# Patient Record
Sex: FEMALE | Race: OTHER | HISPANIC OR LATINO
[De-identification: names, ages, dates, MRNs, and addresses within clinical notes are randomized per-mention and may not be internally consistent; named-entity substitution may affect disease eponyms.]

---

## 2021-01-19 ENCOUNTER — APPOINTMENT (OUTPATIENT)
Dept: ANTEPARTUM | Facility: HOSPITAL | Age: 21
End: 2021-01-19

## 2021-01-19 ENCOUNTER — EMERGENCY (EMERGENCY)
Facility: HOSPITAL | Age: 21
LOS: 0 days | Discharge: ANOTHER HOSPITAL | End: 2021-01-19
Attending: EMERGENCY MEDICINE
Payer: MEDICAID

## 2021-01-19 ENCOUNTER — OUTPATIENT (OUTPATIENT)
Dept: INPATIENT UNIT | Facility: HOSPITAL | Age: 21
LOS: 1 days | End: 2021-01-19

## 2021-01-19 ENCOUNTER — APPOINTMENT (OUTPATIENT)
Dept: ANTEPARTUM | Facility: CLINIC | Age: 21
End: 2021-01-19

## 2021-01-19 ENCOUNTER — ASOB RESULT (OUTPATIENT)
Age: 21
End: 2021-01-19

## 2021-01-19 VITALS
TEMPERATURE: 99 F | SYSTOLIC BLOOD PRESSURE: 112 MMHG | RESPIRATION RATE: 16 BRPM | DIASTOLIC BLOOD PRESSURE: 65 MMHG | HEART RATE: 79 BPM

## 2021-01-19 VITALS
RESPIRATION RATE: 19 BRPM | DIASTOLIC BLOOD PRESSURE: 79 MMHG | HEIGHT: 65 IN | OXYGEN SATURATION: 100 % | HEART RATE: 87 BPM | TEMPERATURE: 99 F | SYSTOLIC BLOOD PRESSURE: 129 MMHG | WEIGHT: 130.07 LBS

## 2021-01-19 VITALS — SYSTOLIC BLOOD PRESSURE: 97 MMHG | HEART RATE: 83 BPM | DIASTOLIC BLOOD PRESSURE: 55 MMHG

## 2021-01-19 DIAGNOSIS — Z3A.20 20 WEEKS GESTATION OF PREGNANCY: ICD-10-CM

## 2021-01-19 DIAGNOSIS — O26.892 OTHER SPECIFIED PREGNANCY RELATED CONDITIONS, SECOND TRIMESTER: ICD-10-CM

## 2021-01-19 DIAGNOSIS — R10.9 UNSPECIFIED ABDOMINAL PAIN: ICD-10-CM

## 2021-01-19 DIAGNOSIS — Z98.890 OTHER SPECIFIED POSTPROCEDURAL STATES: Chronic | ICD-10-CM

## 2021-01-19 LAB
ALBUMIN SERPL ELPH-MCNC: 2.6 G/DL — LOW (ref 3.3–5)
ALBUMIN SERPL ELPH-MCNC: 3.1 G/DL — LOW (ref 3.3–5)
ALP SERPL-CCNC: 71 U/L — SIGNIFICANT CHANGE UP (ref 40–120)
ALP SERPL-CCNC: 78 U/L — SIGNIFICANT CHANGE UP (ref 40–120)
ALT FLD-CCNC: 16 U/L — SIGNIFICANT CHANGE UP (ref 12–78)
ALT FLD-CCNC: 8 U/L — SIGNIFICANT CHANGE UP (ref 4–33)
ANION GAP SERPL CALC-SCNC: 7 MMOL/L — SIGNIFICANT CHANGE UP (ref 5–17)
ANION GAP SERPL CALC-SCNC: 7 MMOL/L — SIGNIFICANT CHANGE UP (ref 7–14)
APPEARANCE UR: ABNORMAL
APPEARANCE UR: CLEAR — SIGNIFICANT CHANGE UP
AST SERPL-CCNC: 15 U/L — SIGNIFICANT CHANGE UP (ref 4–32)
AST SERPL-CCNC: 9 U/L — LOW (ref 15–37)
BACTERIA # UR AUTO: ABNORMAL
BACTERIA # UR AUTO: ABNORMAL
BASOPHILS # BLD AUTO: 0 K/UL — SIGNIFICANT CHANGE UP (ref 0–0.2)
BASOPHILS # BLD AUTO: 0.03 K/UL — SIGNIFICANT CHANGE UP (ref 0–0.2)
BASOPHILS NFR BLD AUTO: 0 % — SIGNIFICANT CHANGE UP (ref 0–2)
BASOPHILS NFR BLD AUTO: 0.2 % — SIGNIFICANT CHANGE UP (ref 0–2)
BILIRUB SERPL-MCNC: 0.2 MG/DL — SIGNIFICANT CHANGE UP (ref 0.2–1.2)
BILIRUB SERPL-MCNC: <0.2 MG/DL — SIGNIFICANT CHANGE UP (ref 0.2–1.2)
BILIRUB UR-MCNC: NEGATIVE — SIGNIFICANT CHANGE UP
BILIRUB UR-MCNC: NEGATIVE — SIGNIFICANT CHANGE UP
BLD GP AB SCN SERPL QL: SIGNIFICANT CHANGE UP
BUN SERPL-MCNC: 3 MG/DL — LOW (ref 7–23)
BUN SERPL-MCNC: 4 MG/DL — LOW (ref 7–23)
CALCIUM SERPL-MCNC: 8.1 MG/DL — LOW (ref 8.5–10.1)
CALCIUM SERPL-MCNC: 8.5 MG/DL — SIGNIFICANT CHANGE UP (ref 8.4–10.5)
CHLORIDE SERPL-SCNC: 105 MMOL/L — SIGNIFICANT CHANGE UP (ref 98–107)
CHLORIDE SERPL-SCNC: 108 MMOL/L — SIGNIFICANT CHANGE UP (ref 96–108)
CO2 SERPL-SCNC: 25 MMOL/L — SIGNIFICANT CHANGE UP (ref 22–31)
CO2 SERPL-SCNC: 25 MMOL/L — SIGNIFICANT CHANGE UP (ref 22–31)
COLOR SPEC: SIGNIFICANT CHANGE UP
COLOR SPEC: YELLOW — SIGNIFICANT CHANGE UP
CREAT SERPL-MCNC: 0.43 MG/DL — LOW (ref 0.5–1.3)
CREAT SERPL-MCNC: 0.51 MG/DL — SIGNIFICANT CHANGE UP (ref 0.5–1.3)
DIFF PNL FLD: NEGATIVE — SIGNIFICANT CHANGE UP
DIFF PNL FLD: NEGATIVE — SIGNIFICANT CHANGE UP
EOSINOPHIL # BLD AUTO: 0.14 K/UL — SIGNIFICANT CHANGE UP (ref 0–0.5)
EOSINOPHIL # BLD AUTO: 0.18 K/UL — SIGNIFICANT CHANGE UP (ref 0–0.5)
EOSINOPHIL NFR BLD AUTO: 1 % — SIGNIFICANT CHANGE UP (ref 0–6)
EOSINOPHIL NFR BLD AUTO: 1.4 % — SIGNIFICANT CHANGE UP (ref 0–6)
EPI CELLS # UR: >27 /HPF — HIGH (ref 0–5)
EPI CELLS # UR: ABNORMAL
GLUCOSE SERPL-MCNC: 72 MG/DL — SIGNIFICANT CHANGE UP (ref 70–99)
GLUCOSE SERPL-MCNC: 77 MG/DL — SIGNIFICANT CHANGE UP (ref 70–99)
GLUCOSE UR QL: NEGATIVE MG/DL — SIGNIFICANT CHANGE UP
GLUCOSE UR QL: NEGATIVE — SIGNIFICANT CHANGE UP
HCG SERPL-ACNC: HIGH MIU/ML
HCG UR QL: POSITIVE
HCT VFR BLD CALC: 26.2 % — LOW (ref 34.5–45)
HCT VFR BLD CALC: 29.2 % — LOW (ref 34.5–45)
HGB BLD-MCNC: 9 G/DL — LOW (ref 11.5–15.5)
HGB BLD-MCNC: 9.9 G/DL — LOW (ref 11.5–15.5)
HYALINE CASTS # UR AUTO: 1 /LPF — SIGNIFICANT CHANGE UP (ref 0–7)
IANC: 9.75 K/UL — HIGH (ref 1.5–8.5)
IMM GRANULOCYTES NFR BLD AUTO: 1.6 % — HIGH (ref 0–1.5)
INR BLD: 1.02 RATIO — SIGNIFICANT CHANGE UP (ref 0.88–1.16)
KETONES UR-MCNC: NEGATIVE — SIGNIFICANT CHANGE UP
KETONES UR-MCNC: NEGATIVE — SIGNIFICANT CHANGE UP
LEUKOCYTE ESTERASE UR-ACNC: ABNORMAL
LEUKOCYTE ESTERASE UR-ACNC: ABNORMAL
LYMPHOCYTES # BLD AUTO: 1.77 K/UL — SIGNIFICANT CHANGE UP (ref 1–3.3)
LYMPHOCYTES # BLD AUTO: 14 % — SIGNIFICANT CHANGE UP (ref 13–44)
LYMPHOCYTES # BLD AUTO: 32 % — SIGNIFICANT CHANGE UP (ref 13–44)
LYMPHOCYTES # BLD AUTO: 4.36 K/UL — HIGH (ref 1–3.3)
MANUAL SMEAR VERIFICATION: SIGNIFICANT CHANGE UP
MCHC RBC-ENTMCNC: 26.3 PG — LOW (ref 27–34)
MCHC RBC-ENTMCNC: 26.4 PG — LOW (ref 27–34)
MCHC RBC-ENTMCNC: 33.9 GM/DL — SIGNIFICANT CHANGE UP (ref 32–36)
MCHC RBC-ENTMCNC: 34.4 GM/DL — SIGNIFICANT CHANGE UP (ref 32–36)
MCV RBC AUTO: 76.8 FL — LOW (ref 80–100)
MCV RBC AUTO: 77.5 FL — LOW (ref 80–100)
MONOCYTES # BLD AUTO: 0.54 K/UL — SIGNIFICANT CHANGE UP (ref 0–0.9)
MONOCYTES # BLD AUTO: 0.73 K/UL — SIGNIFICANT CHANGE UP (ref 0–0.9)
MONOCYTES NFR BLD AUTO: 4 % — SIGNIFICANT CHANGE UP (ref 2–14)
MONOCYTES NFR BLD AUTO: 5.8 % — SIGNIFICANT CHANGE UP (ref 2–14)
NEUTROPHILS # BLD AUTO: 8.57 K/UL — HIGH (ref 1.8–7.4)
NEUTROPHILS # BLD AUTO: 9.75 K/UL — HIGH (ref 1.8–7.4)
NEUTROPHILS NFR BLD AUTO: 62 % — SIGNIFICANT CHANGE UP (ref 43–77)
NEUTROPHILS NFR BLD AUTO: 77 % — SIGNIFICANT CHANGE UP (ref 43–77)
NEUTS BAND # BLD: 1 % — SIGNIFICANT CHANGE UP (ref 0–8)
NITRITE UR-MCNC: NEGATIVE — SIGNIFICANT CHANGE UP
NITRITE UR-MCNC: NEGATIVE — SIGNIFICANT CHANGE UP
NRBC # BLD: 0 /100 WBCS — SIGNIFICANT CHANGE UP
NRBC # BLD: 0 /100 — SIGNIFICANT CHANGE UP (ref 0–0)
NRBC # BLD: SIGNIFICANT CHANGE UP /100 WBCS (ref 0–0)
NRBC # FLD: 0 K/UL — SIGNIFICANT CHANGE UP
PH UR: 5 — SIGNIFICANT CHANGE UP (ref 5–8)
PH UR: 6.5 — SIGNIFICANT CHANGE UP (ref 5–8)
PLAT MORPH BLD: NORMAL — SIGNIFICANT CHANGE UP
PLATELET # BLD AUTO: 264 K/UL — SIGNIFICANT CHANGE UP (ref 150–400)
PLATELET # BLD AUTO: 283 K/UL — SIGNIFICANT CHANGE UP (ref 150–400)
POTASSIUM SERPL-MCNC: 3.1 MMOL/L — LOW (ref 3.5–5.3)
POTASSIUM SERPL-MCNC: 3.4 MMOL/L — LOW (ref 3.5–5.3)
POTASSIUM SERPL-SCNC: 3.1 MMOL/L — LOW (ref 3.5–5.3)
POTASSIUM SERPL-SCNC: 3.4 MMOL/L — LOW (ref 3.5–5.3)
PROT SERPL-MCNC: 6.1 G/DL — SIGNIFICANT CHANGE UP (ref 6–8.3)
PROT SERPL-MCNC: 6.6 GM/DL — SIGNIFICANT CHANGE UP (ref 6–8.3)
PROT UR-MCNC: ABNORMAL
PROT UR-MCNC: NEGATIVE MG/DL — SIGNIFICANT CHANGE UP
PROTHROM AB SERPL-ACNC: 11.8 SEC — SIGNIFICANT CHANGE UP (ref 10.6–13.6)
RBC # BLD: 3.41 M/UL — LOW (ref 3.8–5.2)
RBC # BLD: 3.77 M/UL — LOW (ref 3.8–5.2)
RBC # FLD: 13.7 % — SIGNIFICANT CHANGE UP (ref 10.3–14.5)
RBC # FLD: 13.9 % — SIGNIFICANT CHANGE UP (ref 10.3–14.5)
RBC BLD AUTO: SIGNIFICANT CHANGE UP
RBC CASTS # UR COMP ASSIST: 3 /HPF — SIGNIFICANT CHANGE UP (ref 0–4)
SARS-COV-2 RNA SPEC QL NAA+PROBE: SIGNIFICANT CHANGE UP
SODIUM SERPL-SCNC: 137 MMOL/L — SIGNIFICANT CHANGE UP (ref 135–145)
SODIUM SERPL-SCNC: 140 MMOL/L — SIGNIFICANT CHANGE UP (ref 135–145)
SP GR SPEC: 1.01 — SIGNIFICANT CHANGE UP (ref 1.01–1.02)
SP GR SPEC: 1.02 — SIGNIFICANT CHANGE UP (ref 1.01–1.02)
UROBILINOGEN FLD QL: NEGATIVE MG/DL — SIGNIFICANT CHANGE UP
UROBILINOGEN FLD QL: SIGNIFICANT CHANGE UP
WBC # BLD: 12.66 K/UL — HIGH (ref 3.8–10.5)
WBC # BLD: 13.61 K/UL — HIGH (ref 3.8–10.5)
WBC # FLD AUTO: 12.66 K/UL — HIGH (ref 3.8–10.5)
WBC # FLD AUTO: 13.61 K/UL — HIGH (ref 3.8–10.5)
WBC UR QL: 14 /HPF — HIGH (ref 0–5)
WBC UR QL: ABNORMAL

## 2021-01-19 PROCEDURE — 99285 EMERGENCY DEPT VISIT HI MDM: CPT

## 2021-01-19 NOTE — OB PROVIDER TRIAGE NOTE - NSOBPROVIDERNOTE_OBGYN_ALL_OB_FT
h+p obtained via  # [id number]    20 y.o.  MARK unknown LMP unknown transfer from [facility] via stretcher, via ambulance for evaluation of abdominal pain last night now resolved. Pt denies LOF, VB, fever, chills, nausea, vomiting, sexual intercourse in past 24 hours. Pt states normal BM last night and positive fetal movements. Denies past covid 19 infection and past covid 19 direct contact. Pt states she traveled for 2 [place] and [place] 1 [place] ago.     allergies:  NKDA  medications:  prenatal vitamins    med/surg hx:   denies    OBGYN hx:  sab@ 6 weeks with d+c    abdomen soft, nontender, diffuse pustules noted (patients states x 1 month 2/2 "allergic reaction to dog")  taus: sliup, cephalic presentation, posterior placenta, bpp 8/8, gagan 14.9, efw 1548 grams (~31.2 weeks)  sse: moderate frothy discharge noted, cervix appears closed  tvus: cervical length 5.2, no dynamic changes/funneling noted  nst wnl  toco: no ctx noted    ua, cbc, cmp, gc/chlamydia, genital culture pending    ATU sono pending    Phil, NP h+p obtained via  # [id number]    20 y.o.  MARK unknown LMP unknown transfer from [facility] via stretcher, via ambulance for evaluation of abdominal pain last night now resolved. Pt denies LOF, VB, fever, chills, nausea, vomiting, sexual intercourse in past 24 hours. Pt states normal BM last night and positive fetal movements. Denies past covid 19 infection and past covid 19 direct contact. Pt states she traveled for 2 [place] and [place] 1 [place].     allergies:  NKDA  medications:  prenatal vitamins    med/surg hx:   denies    OBGYN hx:  sab@ 6 weeks with d+c    abdomen soft, nontender, diffuse pustules noted (patients states x 1 month 2/2 "allergic reaction to dog")  taus: sliup, cephalic presentation, posterior placenta, bpp 8/8, gagan 14.9, efw 1548 grams (~31.2 weeks)  sse: moderate frothy discharge noted, cervix appears closed  tvus: cervical length 5.2, no dynamic changes/funneling noted  nst wnl  toco: no ctx noted    ua, cbc, cmp, gc/chlamydia, genital culture, covid 19 swab pending    ATU sono pending    Phil, NP h+p obtained via  # [id number]    20 y.o.  MARK unknown LMP unknown transfer from [facility] via stretcher, via ambulance for evaluation of abdominal pain last night now resolved. Pt denies LOF, VB, fever, chills, nausea, vomiting, sexual intercourse in past 24 hours. Pt states normal BM last night and positive fetal movements. Denies past covid 19 infection and past covid 19 direct contact. Pt states she traveled for 2 [place] and [place] 1 [place].     allergies:  NKDA  medications:  prenatal vitamins    med/surg hx:   denies    OBGYN hx:  sab@ 6 weeks with d+c    abdomen soft, nontender, diffuse pustules noted (patients states x 1 month 2/2 "allergic reaction to dog")  taus: sliup, cephalic presentation, posterior placenta, bpp 8/8, gagan 14.9, efw 1548 grams (~31.2 weeks)  sse: moderate frothy discharge noted, cervix appears closed  tvus: cervical length 5.2, no dynamic changes/funneling noted  nst wnl  toco: no ctx noted    ua, cbc, cmp, gc/chlamydia, genital culture, covid 19 swab pending    ATU sono pending    12p report to ELEUTERIO Clay NP.    1400  Resumed care of patient. Awaiting ATU sono    1408  labs reviewed:    12.6 > 26.2 < 264    K+ 3.4    UA contaminated    ATU sono:    MARK ~ 2021 ~GA 29.1 weeks    EFW 1316 g 4lbs 14 oz  gagan 19, cephalic presentation, posterior placenta    1555  Dr Mejia and Dr Vargas called. Awaiting discussion re: plan of care    ISELA Villarreal h+p obtained via  # [id number]    20 y.o.  MARK unknown LMP unknown transfer from [facility] via stretcher, via ambulance for evaluation of abdominal pain last night now resolved. Pt denies LOF, VB, fever, chills, nausea, vomiting, sexual intercourse in past 24 hours. Pt states normal BM last night and positive fetal movements. Denies past covid 19 infection and past covid 19 direct contact. Pt states she traveled for 2 [place] and [place].     allergies:  NKDA  medications:  prenatal vitamins    med/surg hx:   denies    OBGYN hx:  sab@ 6 weeks with d+c    abdomen soft, nontender, diffuse pustules noted (patients states x 1 month 2/2 "allergic reaction to dog")  taus: sliup, cephalic presentation, posterior placenta, bpp 8/8, gagan 14.9, efw 1548 grams (~31.2 weeks)  sse: moderate frothy discharge noted, cervix appears closed  tvus: cervical length 5.2, no dynamic changes/funneling noted  nst wnl  toco: no ctx noted    ua, cbc, cmp, gc/chlamydia, genital culture, covid 19 swab pending    ATU sono pending    12p report to ELEUTERIO Clay NP.    1400  Resumed care of patient. Awaiting ATU sono    1408  labs reviewed:    12.6 > 9/26.2 < 264    K+ 3.4    UA contaminated    ATU sono:    MARK ~ 2021 ~GA 29.1 weeks    EFW 1316 g 4lbs 14 oz  gagan 19, cephalic presentation, posterior placenta    1555  Dr Mejia and Dr Vargas called. Awaiting discussion re: plan of care    1605  Discussed with Dr Mejia. Dr Mejia reviewing lab results. Pt given Prenatal Care Programs Accepting Medicaid Reimbursement form. PCAP clinic emailed to establish prenatal care appointment.  labor precautions/fetal kick counts reviewed. Encouraged increased PO hydration.     ISELA Villarreal h+p obtained via  # [id number]    20 y.o.  MARK unknown LMP unknown transfer from [facility] via stretcher, via ambulance for evaluation of abdominal pain last night now resolved. Pt denies LOF, VB, fever, chills, nausea, vomiting, sexual intercourse in past 24 hours. Pt states normal BM last night and positive fetal movements. Denies past covid 19 infection and past covid 19 direct contact. Pt states she traveled for 2 [place] and [place] 1 [place].     allergies:  NKDA  medications:  prenatal vitamins    med/surg hx:   denies    OBGYN hx:  sab@ 6 weeks with d+c    abdomen soft, nontender, diffuse pustules noted (patients states x 1 month 2/2 "allergic reaction to dog")  taus: sliup, cephalic presentation, posterior placenta, bpp 8/8, gagan 14.9, efw 1548 grams (~31.2 weeks)  sse: moderate frothy discharge noted, cervix appears closed  tvus: cervical length 5.2, no dynamic changes/funneling noted  nst wnl  toco: no ctx noted    ua, cbc, cmp, gc/chlamydia, genital culture, covid 19 swab pending    ATU sono pending    12p report to ELEUTERIO Clay NP.    1400  Resumed care of patient. Awaiting ATU sono    1408  labs reviewed:    12.6 > 9/26.2 < 264    K+ 3.4    UA contaminated    ATU sono:    MARK ~ 2021 ~GA 29.1 weeks    EFW 1316 g 4lbs 14 oz  gagan 19, cephalic presentation, posterior placenta    1555  Dr Mejia and Dr Vargas called. Awaiting discussion re: plan of care    1605  Discussed with Dr Mejia. Dr Mejia reviewing lab results. Pt given Prenatal Care Programs Accepting Medicaid Reimbursement form. PCAP clinic emailed to establish prenatal care appointment.  labor precautions/fetal kick counts reviewed via  # [id number]. Encouraged increased PO hydration.     ISELA Villarreal

## 2021-01-19 NOTE — OB PROVIDER TRIAGE NOTE - HISTORY OF PRESENT ILLNESS
h+p obtained via  # [id number]  20 y.o.  MARK unknown LMP unknown transfer from [facility] via stretcher, via ambulance for evaluation of abdominal pain last night now resolved. Pt denies LOF, VB, fever, chills, nausea, vomiting, sexual intercourse in past 24 hours. Pt states normal BM last night and positive fetal movements. Denies past covid 19 infection and past covid 19 direct contact. Pt states she traveled for 2 [place] and [place] 1 [place].

## 2021-01-19 NOTE — ED PROVIDER NOTE - CLINICAL SUMMARY MEDICAL DECISION MAKING FREE TEXT BOX
20 year old woman  over 20 weeks gestation c/o abdominal pain.  IUP noted on bedside US with .  No abdominal tenderness on exam patient in no acute distress; vitals stable.  Patient transferred to [facility] for obstetric care and evaluation.

## 2021-01-19 NOTE — OB PROVIDER TRIAGE NOTE - NSHPPHYSICALEXAM_GEN_ALL_CORE
abdomen soft, nontender, diffuse pustules noted (patients states x 1 month 2/2 "allergic reaction to dog")  taus: sliup, cephalic presentation, posterior placenta, bpp 8/8, gagan 14.9, efw 1548 grams (~31.2 weeks)  sse: moderate frothy discharge noted, cervix appears closed  tvus: cervical length 5.2, no dynamic changes/funneling noted  nst wnl  toco: no ctx noted

## 2021-01-19 NOTE — ED ADULT NURSE NOTE - NSIMPLEMENTINTERV_GEN_ALL_ED
Implemented All Universal Safety Interventions:  [place] to call system. Call bell, personal items and telephone within reach. Instruct patient to call for assistance. Room bathroom lighting operational. Non-slip footwear when patient is off stretcher. Physically safe environment: no spills, clutter or unnecessary equipment. Stretcher in lowest position, wheels locked, appropriate side rails in place.

## 2021-01-19 NOTE — OB RN TRIAGE NOTE - CHIEF COMPLAINT QUOTE
abd pain x1 week, mostly at night, small amt pain currently  travelled here from [facility] 1 month, no [facility],   [id number]  #

## 2021-01-19 NOTE — ED ADULT TRIAGE NOTE - CHIEF COMPLAINT QUOTE
patient c/o of lower abdominal pain with back pain started 1 week ago , patient is 7 months pregnant denied vaginal bleed or discharge c/o of N/V , denied fever no cough denied chest pain no difficulty breathing , at the time of triage

## 2021-01-19 NOTE — ED ADULT NURSE NOTE - OBJECTIVE STATEMENT
Pt is a Turkish speaking 21 yo F, AOx4 c/o of lower abdominal pain with back pain started 1 week ago , patient is 7 months pregnant denied vaginal bleed or discharge c/o of N/V , denied fever no cough denied chest pain no difficulty breathing. Pt denies any pmh, sx hx, allergy.

## 2021-01-19 NOTE — ED PROVIDER NOTE - PROGRESS NOTE DETAILS
IUP noted on bedside ultrasound with  Transfer center unable to reach OB will try again and call back in 10 minutes.

## 2021-01-19 NOTE — ED PROVIDER NOTE - OBJECTIVE STATEMENT
This patient is a 20 year old woman This patient is a 20 year old woman who presents to the ER reporting that she is pregnant and experiencing abdominal pain x1 week.  Patient states that she was last "checked" in this pregnancy 4 months ago when she was living in [facility] and has had no prenatal care.  She states that she was 3 months pregnant at that time.  Patient unable to give dates for her last menses.  Patient has been in the [place] for 1 [place].  Patient states that the pain is intermittent 10/10 in severity and radiates to her back bilaterally.  She denies fever, dysuria, hematuria, and vomiting. This patient is a 20 year old woman who presents to the ER reporting that she is pregnant and experiencing abdominal pain x1 week.  Patient states that she was last "checked" in this pregnancy 4 months ago when she was living in [facility] and has had no prenatal care.  She states that she was 3 months pregnant at that time.  Patient unable to give dates for her last menses.  Patient has been in the [place] for 1 [place].  Patient states that the pain is intermittent 10/10 in severity and radiates to her back bilaterally.  She denies fever, dysuria, hematuria, and vomiting.     Abundio: [id number]

## 2021-01-20 LAB
C TRACH RRNA SPEC QL NAA+PROBE: SIGNIFICANT CHANGE UP
N GONORRHOEA RRNA SPEC QL NAA+PROBE: SIGNIFICANT CHANGE UP

## 2021-01-21 PROBLEM — Z00.00 ENCOUNTER FOR PREVENTIVE HEALTH EXAMINATION: Status: ACTIVE | Noted: 2021-01-21

## 2021-02-03 ENCOUNTER — NON-APPOINTMENT (OUTPATIENT)
Age: 21
End: 2021-02-03

## 2021-02-03 ENCOUNTER — RESULT REVIEW (OUTPATIENT)
Age: 21
End: 2021-02-03

## 2021-02-03 ENCOUNTER — APPOINTMENT (OUTPATIENT)
Dept: OBGYN | Facility: HOSPITAL | Age: 21
End: 2021-02-03

## 2021-02-03 ENCOUNTER — OUTPATIENT (OUTPATIENT)
Dept: OUTPATIENT SERVICES | Facility: HOSPITAL | Age: 21
LOS: 1 days | End: 2021-02-03
Payer: MEDICAID

## 2021-02-03 ENCOUNTER — MED ADMIN CHARGE (OUTPATIENT)
Age: 21
End: 2021-02-03

## 2021-02-03 VITALS
WEIGHT: 134 LBS | DIASTOLIC BLOOD PRESSURE: 61 MMHG | SYSTOLIC BLOOD PRESSURE: 120 MMHG | BODY MASS INDEX: 24.66 KG/M2 | HEART RATE: 98 BPM | HEIGHT: 62 IN | TEMPERATURE: 98.4 F

## 2021-02-03 DIAGNOSIS — Z98.890 OTHER SPECIFIED POSTPROCEDURAL STATES: Chronic | ICD-10-CM

## 2021-02-03 PROBLEM — R21 RASH AND OTHER NONSPECIFIC SKIN ERUPTION: Chronic | Status: ACTIVE | Noted: 2021-01-19

## 2021-02-03 LAB
ALBUMIN SERPL ELPH-MCNC: 3.5 G/DL — SIGNIFICANT CHANGE UP (ref 3.3–5)
ALP SERPL-CCNC: 90 U/L — SIGNIFICANT CHANGE UP (ref 40–120)
ALT FLD-CCNC: 9 U/L — SIGNIFICANT CHANGE UP (ref 4–33)
ANION GAP SERPL CALC-SCNC: 12 MMOL/L — SIGNIFICANT CHANGE UP (ref 7–14)
APPEARANCE UR: ABNORMAL
AST SERPL-CCNC: 16 U/L — SIGNIFICANT CHANGE UP (ref 4–32)
BACTERIA # UR AUTO: ABNORMAL
BASOPHILS # BLD AUTO: 0.04 K/UL — SIGNIFICANT CHANGE UP (ref 0–0.2)
BASOPHILS NFR BLD AUTO: 0.3 % — SIGNIFICANT CHANGE UP (ref 0–2)
BILIRUB SERPL-MCNC: 0.2 MG/DL — SIGNIFICANT CHANGE UP (ref 0.2–1.2)
BILIRUB UR-MCNC: NEGATIVE — SIGNIFICANT CHANGE UP
BUN SERPL-MCNC: 4 MG/DL — LOW (ref 7–23)
CALCIUM SERPL-MCNC: 8.8 MG/DL — SIGNIFICANT CHANGE UP (ref 8.4–10.5)
CHLORIDE SERPL-SCNC: 103 MMOL/L — SIGNIFICANT CHANGE UP (ref 98–107)
CO2 SERPL-SCNC: 23 MMOL/L — SIGNIFICANT CHANGE UP (ref 22–31)
COLOR SPEC: SIGNIFICANT CHANGE UP
CREAT SERPL-MCNC: 0.48 MG/DL — LOW (ref 0.5–1.3)
DIFF PNL FLD: NEGATIVE — SIGNIFICANT CHANGE UP
EOSINOPHIL # BLD AUTO: 0.21 K/UL — SIGNIFICANT CHANGE UP (ref 0–0.5)
EOSINOPHIL NFR BLD AUTO: 1.6 % — SIGNIFICANT CHANGE UP (ref 0–6)
EPI CELLS # UR: 24 /HPF — HIGH (ref 0–5)
GLUCOSE 1H P MEAL SERPL-MCNC: 83 MG/DL — SIGNIFICANT CHANGE UP (ref 70–134)
GLUCOSE SERPL-MCNC: 79 MG/DL — SIGNIFICANT CHANGE UP (ref 70–99)
GLUCOSE UR QL: NEGATIVE — SIGNIFICANT CHANGE UP
HCT VFR BLD CALC: 31.3 % — LOW (ref 34.5–45)
HGB BLD-MCNC: 10.1 G/DL — LOW (ref 11.5–15.5)
HIV 1+2 AB+HIV1 P24 AG SERPL QL IA: SIGNIFICANT CHANGE UP
HYALINE CASTS # UR AUTO: 1 /LPF — SIGNIFICANT CHANGE UP (ref 0–7)
IANC: 9.91 K/UL — HIGH (ref 1.5–8.5)
IMM GRANULOCYTES NFR BLD AUTO: 2.2 % — HIGH (ref 0–1.5)
KETONES UR-MCNC: NEGATIVE — SIGNIFICANT CHANGE UP
LEUKOCYTE ESTERASE UR-ACNC: ABNORMAL
LYMPHOCYTES # BLD AUTO: 15.4 % — SIGNIFICANT CHANGE UP (ref 13–44)
LYMPHOCYTES # BLD AUTO: 2.06 K/UL — SIGNIFICANT CHANGE UP (ref 1–3.3)
MCHC RBC-ENTMCNC: 25.3 PG — LOW (ref 27–34)
MCHC RBC-ENTMCNC: 32.3 GM/DL — SIGNIFICANT CHANGE UP (ref 32–36)
MCV RBC AUTO: 78.4 FL — LOW (ref 80–100)
MONOCYTES # BLD AUTO: 0.87 K/UL — SIGNIFICANT CHANGE UP (ref 0–0.9)
MONOCYTES NFR BLD AUTO: 6.5 % — SIGNIFICANT CHANGE UP (ref 2–14)
NEUTROPHILS # BLD AUTO: 9.91 K/UL — HIGH (ref 1.8–7.4)
NEUTROPHILS NFR BLD AUTO: 74 % — SIGNIFICANT CHANGE UP (ref 43–77)
NITRITE UR-MCNC: NEGATIVE — SIGNIFICANT CHANGE UP
NRBC # BLD: 0 /100 WBCS — SIGNIFICANT CHANGE UP
NRBC # FLD: 0 K/UL — SIGNIFICANT CHANGE UP
PH UR: 6 — SIGNIFICANT CHANGE UP (ref 5–8)
PLATELET # BLD AUTO: 325 K/UL — SIGNIFICANT CHANGE UP (ref 150–400)
POTASSIUM SERPL-MCNC: 3.4 MMOL/L — LOW (ref 3.5–5.3)
POTASSIUM SERPL-SCNC: 3.4 MMOL/L — LOW (ref 3.5–5.3)
PROT SERPL-MCNC: 6.7 G/DL — SIGNIFICANT CHANGE UP (ref 6–8.3)
PROT UR-MCNC: NEGATIVE — SIGNIFICANT CHANGE UP
RBC # BLD: 3.99 M/UL — SIGNIFICANT CHANGE UP (ref 3.8–5.2)
RBC # FLD: 13.7 % — SIGNIFICANT CHANGE UP (ref 10.3–14.5)
RBC CASTS # UR COMP ASSIST: 1 /HPF — SIGNIFICANT CHANGE UP (ref 0–4)
SODIUM SERPL-SCNC: 138 MMOL/L — SIGNIFICANT CHANGE UP (ref 135–145)
SP GR SPEC: 1.01 — LOW (ref 1.01–1.02)
URATE SERPL-MCNC: 3.8 MG/DL — SIGNIFICANT CHANGE UP (ref 2.5–7)
UROBILINOGEN FLD QL: SIGNIFICANT CHANGE UP
WBC # BLD: 13.38 K/UL — HIGH (ref 3.8–10.5)
WBC # FLD AUTO: 13.38 K/UL — HIGH (ref 3.8–10.5)
WBC UR QL: 9 /HPF — HIGH (ref 0–5)

## 2021-02-03 PROCEDURE — 76805 OB US >/= 14 WKS SNGL FETUS: CPT | Mod: 26

## 2021-02-03 PROCEDURE — 83020 HEMOGLOBIN ELECTROPHORESIS: CPT | Mod: 26

## 2021-02-04 DIAGNOSIS — O09.30 SUPERVISION OF PREGNANCY WITH INSUFFICIENT ANTENATAL CARE, UNSPECIFIED TRIMESTER: ICD-10-CM

## 2021-02-04 DIAGNOSIS — Z34.00 ENCOUNTER FOR SUPERVISION OF NORMAL FIRST PREGNANCY, UNSPECIFIED TRIMESTER: ICD-10-CM

## 2021-02-04 DIAGNOSIS — Z23 ENCOUNTER FOR IMMUNIZATION: ICD-10-CM

## 2021-02-04 DIAGNOSIS — Z34.90 ENCOUNTER FOR SUPERVISION OF NORMAL PREGNANCY, UNSPECIFIED, UNSPECIFIED TRIMESTER: ICD-10-CM

## 2021-02-04 LAB
24R-OH-CALCIDIOL SERPL-MCNC: 33.1 NG/ML — SIGNIFICANT CHANGE UP (ref 30–80)
HBV SURFACE AG SER-ACNC: SIGNIFICANT CHANGE UP
HCV AB S/CO SERPL IA: 0.18 S/CO — SIGNIFICANT CHANGE UP (ref 0–0.99)
HCV AB SERPL-IMP: SIGNIFICANT CHANGE UP
LEAD BLD-MCNC: <1 UG/DL — SIGNIFICANT CHANGE UP (ref 0–4)
MEV IGG SER-ACNC: 33.7 AU/ML — SIGNIFICANT CHANGE UP
MEV IGG+IGM SER-IMP: POSITIVE — SIGNIFICANT CHANGE UP
RUBV IGG SER-ACNC: 13.3 INDEX — SIGNIFICANT CHANGE UP
RUBV IGG SER-IMP: POSITIVE — SIGNIFICANT CHANGE UP
SARS-COV-2 IGG SERPL QL IA: POSITIVE
SARS-COV-2 IGM SERPL IA-ACNC: 1.55 INDEX — HIGH
T PALLIDUM AB TITR SER: NEGATIVE — SIGNIFICANT CHANGE UP
VZV IGG FLD QL IA: 15.8 INDEX — SIGNIFICANT CHANGE UP
VZV IGG FLD QL IA: NEGATIVE — SIGNIFICANT CHANGE UP

## 2021-02-05 LAB
CULTURE RESULTS: NO GROWTH — SIGNIFICANT CHANGE UP
GAMMA INTERFERON BACKGROUND BLD IA-ACNC: 0.01 IU/ML — SIGNIFICANT CHANGE UP
M TB IFN-G BLD-IMP: NEGATIVE — SIGNIFICANT CHANGE UP
M TB IFN-G CD4+ BCKGRND COR BLD-ACNC: 0.02 IU/ML — SIGNIFICANT CHANGE UP
M TB IFN-G CD4+CD8+ BCKGRND COR BLD-ACNC: 0.02 IU/ML — SIGNIFICANT CHANGE UP
QUANT TB PLUS MITOGEN MINUS NIL: 9.29 IU/ML — SIGNIFICANT CHANGE UP
SPECIMEN SOURCE: SIGNIFICANT CHANGE UP

## 2021-02-08 ENCOUNTER — NON-APPOINTMENT (OUTPATIENT)
Age: 21
End: 2021-02-08

## 2021-02-08 LAB
HEMOGLOBIN INTERPRETATION: SIGNIFICANT CHANGE UP
HGB A MFR BLD: 59.4 % — LOW
HGB A2 MFR BLD: 3.4 % — SIGNIFICANT CHANGE UP (ref 2.4–3.5)
HGB F MFR BLD: <1 % — SIGNIFICANT CHANGE UP (ref 0–1.5)
HGB S BLD QL: POSITIVE
HGB S MFR BLD: 37.3 % — HIGH
SOLUBILITY: POSITIVE

## 2021-02-11 ENCOUNTER — NON-APPOINTMENT (OUTPATIENT)
Age: 21
End: 2021-02-11

## 2021-02-11 LAB — CYSTIC FIBROSIS EXPANDED PANEL: SIGNIFICANT CHANGE UP

## 2021-02-16 ENCOUNTER — NON-APPOINTMENT (OUTPATIENT)
Age: 21
End: 2021-02-16

## 2021-02-16 ENCOUNTER — OUTPATIENT (OUTPATIENT)
Dept: OUTPATIENT SERVICES | Facility: HOSPITAL | Age: 21
LOS: 1 days | End: 2021-02-16

## 2021-02-16 ENCOUNTER — APPOINTMENT (OUTPATIENT)
Dept: OBGYN | Facility: HOSPITAL | Age: 21
End: 2021-02-16

## 2021-02-16 VITALS
SYSTOLIC BLOOD PRESSURE: 110 MMHG | WEIGHT: 135 LBS | BODY MASS INDEX: 22.49 KG/M2 | HEIGHT: 65 IN | DIASTOLIC BLOOD PRESSURE: 62 MMHG | HEART RATE: 94 BPM | TEMPERATURE: 98.2 F

## 2021-02-16 DIAGNOSIS — Z98.890 OTHER SPECIFIED POSTPROCEDURAL STATES: Chronic | ICD-10-CM

## 2021-02-16 RX ORDER — PRENATAL VIT NO.130/IRON/FOLIC 27MG-0.8MG
28-0.8 TABLET ORAL DAILY
Qty: 30 | Refills: 5 | Status: ACTIVE | COMMUNITY
Start: 2021-02-16 | End: 1900-01-01

## 2021-02-16 RX ORDER — CHLORHEXIDINE GLUCONATE 4 %
325 (65 FE) LIQUID (ML) TOPICAL TWICE DAILY
Qty: 60 | Refills: 3 | Status: ACTIVE | COMMUNITY
Start: 2021-02-16 | End: 1900-01-01

## 2021-02-23 DIAGNOSIS — Z33.1 PREGNANT STATE, INCIDENTAL: ICD-10-CM

## 2021-03-01 ENCOUNTER — NON-APPOINTMENT (OUTPATIENT)
Age: 21
End: 2021-03-01

## 2021-03-02 ENCOUNTER — NON-APPOINTMENT (OUTPATIENT)
Age: 21
End: 2021-03-02

## 2021-03-02 ENCOUNTER — APPOINTMENT (OUTPATIENT)
Dept: OBGYN | Facility: HOSPITAL | Age: 21
End: 2021-03-02

## 2021-03-02 ENCOUNTER — OUTPATIENT (OUTPATIENT)
Dept: OUTPATIENT SERVICES | Facility: HOSPITAL | Age: 21
LOS: 1 days | End: 2021-03-02

## 2021-03-02 VITALS
TEMPERATURE: 98.3 F | WEIGHT: 138 LBS | DIASTOLIC BLOOD PRESSURE: 73 MMHG | HEART RATE: 88 BPM | SYSTOLIC BLOOD PRESSURE: 124 MMHG

## 2021-03-02 DIAGNOSIS — Z98.890 OTHER SPECIFIED POSTPROCEDURAL STATES: Chronic | ICD-10-CM

## 2021-03-04 ENCOUNTER — ASOB RESULT (OUTPATIENT)
Age: 21
End: 2021-03-04

## 2021-03-04 ENCOUNTER — APPOINTMENT (OUTPATIENT)
Dept: ANTEPARTUM | Facility: CLINIC | Age: 21
End: 2021-03-04
Payer: MEDICAID

## 2021-03-04 PROCEDURE — 76816 OB US FOLLOW-UP PER FETUS: CPT

## 2021-03-04 PROCEDURE — 99072 ADDL SUPL MATRL&STAF TM PHE: CPT

## 2021-03-04 PROCEDURE — 76819 FETAL BIOPHYS PROFIL W/O NST: CPT

## 2021-03-08 ENCOUNTER — NON-APPOINTMENT (OUTPATIENT)
Age: 21
End: 2021-03-08

## 2021-03-08 DIAGNOSIS — Z34.93 ENCOUNTER FOR SUPERVISION OF NORMAL PREGNANCY, UNSPECIFIED, THIRD TRIMESTER: ICD-10-CM

## 2021-03-09 ENCOUNTER — NON-APPOINTMENT (OUTPATIENT)
Age: 21
End: 2021-03-09

## 2021-03-09 ENCOUNTER — RESULT REVIEW (OUTPATIENT)
Age: 21
End: 2021-03-09

## 2021-03-09 ENCOUNTER — APPOINTMENT (OUTPATIENT)
Dept: OBGYN | Facility: HOSPITAL | Age: 21
End: 2021-03-09

## 2021-03-09 ENCOUNTER — OUTPATIENT (OUTPATIENT)
Dept: OUTPATIENT SERVICES | Facility: HOSPITAL | Age: 21
LOS: 1 days | End: 2021-03-09

## 2021-03-09 VITALS
WEIGHT: 137 LBS | BODY MASS INDEX: 22.82 KG/M2 | DIASTOLIC BLOOD PRESSURE: 71 MMHG | HEART RATE: 103 BPM | HEIGHT: 65 IN | TEMPERATURE: 98.1 F | SYSTOLIC BLOOD PRESSURE: 117 MMHG

## 2021-03-09 DIAGNOSIS — Z98.890 OTHER SPECIFIED POSTPROCEDURAL STATES: Chronic | ICD-10-CM

## 2021-03-09 LAB — GLUCOSE BLDC GLUCOMTR-MCNC: 110

## 2021-03-11 DIAGNOSIS — Z34.00 ENCOUNTER FOR SUPERVISION OF NORMAL FIRST PREGNANCY, UNSPECIFIED TRIMESTER: ICD-10-CM

## 2021-03-11 DIAGNOSIS — Z00.00 ENCOUNTER FOR GENERAL ADULT MEDICAL EXAMINATION WITHOUT ABNORMAL FINDINGS: ICD-10-CM

## 2021-03-11 LAB
C TRACH RRNA SPEC QL NAA+PROBE: SIGNIFICANT CHANGE UP
CULTURE RESULTS: SIGNIFICANT CHANGE UP
N GONORRHOEA RRNA SPEC QL NAA+PROBE: SIGNIFICANT CHANGE UP
SPECIMEN SOURCE: SIGNIFICANT CHANGE UP
SPECIMEN SOURCE: SIGNIFICANT CHANGE UP

## 2021-03-15 ENCOUNTER — NON-APPOINTMENT (OUTPATIENT)
Age: 21
End: 2021-03-15

## 2021-03-16 ENCOUNTER — OUTPATIENT (OUTPATIENT)
Dept: OUTPATIENT SERVICES | Facility: HOSPITAL | Age: 21
LOS: 1 days | End: 2021-03-16

## 2021-03-16 ENCOUNTER — NON-APPOINTMENT (OUTPATIENT)
Age: 21
End: 2021-03-16

## 2021-03-16 ENCOUNTER — APPOINTMENT (OUTPATIENT)
Dept: OBGYN | Facility: HOSPITAL | Age: 21
End: 2021-03-16

## 2021-03-16 VITALS
HEIGHT: 65 IN | DIASTOLIC BLOOD PRESSURE: 68 MMHG | WEIGHT: 136 LBS | BODY MASS INDEX: 22.66 KG/M2 | TEMPERATURE: 98.1 F | SYSTOLIC BLOOD PRESSURE: 121 MMHG | HEART RATE: 79 BPM

## 2021-03-16 DIAGNOSIS — Z98.890 OTHER SPECIFIED POSTPROCEDURAL STATES: Chronic | ICD-10-CM

## 2021-03-23 ENCOUNTER — NON-APPOINTMENT (OUTPATIENT)
Age: 21
End: 2021-03-23

## 2021-03-23 ENCOUNTER — APPOINTMENT (OUTPATIENT)
Dept: OBGYN | Facility: HOSPITAL | Age: 21
End: 2021-03-23

## 2021-03-23 ENCOUNTER — OUTPATIENT (OUTPATIENT)
Dept: OUTPATIENT SERVICES | Facility: HOSPITAL | Age: 21
LOS: 1 days | End: 2021-03-23

## 2021-03-23 VITALS
TEMPERATURE: 98 F | HEART RATE: 80 BPM | DIASTOLIC BLOOD PRESSURE: 79 MMHG | WEIGHT: 145 LBS | SYSTOLIC BLOOD PRESSURE: 109 MMHG

## 2021-03-23 DIAGNOSIS — Z98.890 OTHER SPECIFIED POSTPROCEDURAL STATES: Chronic | ICD-10-CM

## 2021-03-24 ENCOUNTER — APPOINTMENT (OUTPATIENT)
Dept: ANTEPARTUM | Facility: CLINIC | Age: 21
End: 2021-03-24
Payer: MEDICAID

## 2021-03-24 ENCOUNTER — ASOB RESULT (OUTPATIENT)
Age: 21
End: 2021-03-24

## 2021-03-24 PROCEDURE — 76819 FETAL BIOPHYS PROFIL W/O NST: CPT | Mod: 26

## 2021-03-24 PROCEDURE — 76816 OB US FOLLOW-UP PER FETUS: CPT | Mod: 26

## 2021-03-29 ENCOUNTER — NON-APPOINTMENT (OUTPATIENT)
Age: 21
End: 2021-03-29

## 2021-03-29 DIAGNOSIS — Z34.93 ENCOUNTER FOR SUPERVISION OF NORMAL PREGNANCY, UNSPECIFIED, THIRD TRIMESTER: ICD-10-CM

## 2021-03-30 ENCOUNTER — OUTPATIENT (OUTPATIENT)
Dept: OUTPATIENT SERVICES | Facility: HOSPITAL | Age: 21
LOS: 1 days | End: 2021-03-30

## 2021-03-30 ENCOUNTER — NON-APPOINTMENT (OUTPATIENT)
Age: 21
End: 2021-03-30

## 2021-03-30 ENCOUNTER — APPOINTMENT (OUTPATIENT)
Dept: OBGYN | Facility: HOSPITAL | Age: 21
End: 2021-03-30

## 2021-03-30 VITALS
TEMPERATURE: 98.4 F | DIASTOLIC BLOOD PRESSURE: 81 MMHG | HEART RATE: 86 BPM | WEIGHT: 145 LBS | SYSTOLIC BLOOD PRESSURE: 105 MMHG

## 2021-03-30 DIAGNOSIS — Z98.890 OTHER SPECIFIED POSTPROCEDURAL STATES: Chronic | ICD-10-CM

## 2021-04-06 ENCOUNTER — NON-APPOINTMENT (OUTPATIENT)
Age: 21
End: 2021-04-06

## 2021-04-06 ENCOUNTER — OUTPATIENT (OUTPATIENT)
Dept: OUTPATIENT SERVICES | Facility: HOSPITAL | Age: 21
LOS: 1 days | End: 2021-04-06

## 2021-04-06 ENCOUNTER — APPOINTMENT (OUTPATIENT)
Dept: OBGYN | Facility: HOSPITAL | Age: 21
End: 2021-04-06

## 2021-04-06 VITALS
HEART RATE: 71 BPM | DIASTOLIC BLOOD PRESSURE: 75 MMHG | HEIGHT: 65 IN | TEMPERATURE: 98.2 F | BODY MASS INDEX: 24.49 KG/M2 | WEIGHT: 147 LBS | SYSTOLIC BLOOD PRESSURE: 124 MMHG

## 2021-04-06 DIAGNOSIS — Z98.890 OTHER SPECIFIED POSTPROCEDURAL STATES: Chronic | ICD-10-CM

## 2021-04-06 DIAGNOSIS — Z34.00 ENCOUNTER FOR SUPERVISION OF NORMAL FIRST PREGNANCY, UNSPECIFIED TRIMESTER: ICD-10-CM

## 2021-04-07 ENCOUNTER — NON-APPOINTMENT (OUTPATIENT)
Age: 21
End: 2021-04-07

## 2021-04-07 DIAGNOSIS — O99.019 ANEMIA COMPLICATING PREGNANCY, UNSPECIFIED TRIMESTER: ICD-10-CM

## 2021-04-07 DIAGNOSIS — O48.0 POST-TERM PREGNANCY: ICD-10-CM

## 2021-04-07 DIAGNOSIS — Z34.00 ENCOUNTER FOR SUPERVISION OF NORMAL FIRST PREGNANCY, UNSPECIFIED TRIMESTER: ICD-10-CM

## 2021-04-10 ENCOUNTER — APPOINTMENT (OUTPATIENT)
Dept: DISASTER EMERGENCY | Facility: CLINIC | Age: 21
End: 2021-04-10

## 2021-04-12 ENCOUNTER — APPOINTMENT (OUTPATIENT)
Dept: ANTEPARTUM | Facility: CLINIC | Age: 21
End: 2021-04-12

## 2021-04-14 ENCOUNTER — NON-APPOINTMENT (OUTPATIENT)
Age: 21
End: 2021-04-14

## 2021-04-14 DIAGNOSIS — Z34.00 ENCOUNTER FOR SUPERVISION OF NORMAL FIRST PREGNANCY, UNSPECIFIED TRIMESTER: ICD-10-CM

## 2021-04-14 DIAGNOSIS — Z23 ENCOUNTER FOR IMMUNIZATION: ICD-10-CM

## 2021-04-14 DIAGNOSIS — O48.0 POST-TERM PREGNANCY: ICD-10-CM

## 2021-04-14 DIAGNOSIS — O09.30 SUPERVISION OF PREGNANCY WITH INSUFFICIENT ANTENATAL CARE, UNSPECIFIED TRIMESTER: ICD-10-CM

## 2021-04-14 DIAGNOSIS — Z01.818 ENCOUNTER FOR OTHER PREPROCEDURAL EXAMINATION: ICD-10-CM

## 2021-04-14 DIAGNOSIS — Z92.29 PERSONAL HISTORY OF OTHER DRUG THERAPY: ICD-10-CM

## 2021-04-14 DIAGNOSIS — O99.019 ANEMIA COMPLICATING PREGNANCY, UNSPECIFIED TRIMESTER: ICD-10-CM

## 2022-05-10 ENCOUNTER — ASOB RESULT (OUTPATIENT)
Age: 22
End: 2022-05-10

## 2022-05-10 ENCOUNTER — APPOINTMENT (OUTPATIENT)
Dept: MATERNAL FETAL MEDICINE | Facility: CLINIC | Age: 22
End: 2022-05-10
Payer: MEDICAID

## 2022-05-10 PROCEDURE — 99442: CPT | Mod: 95

## 2022-05-16 ENCOUNTER — APPOINTMENT (OUTPATIENT)
Dept: ANTEPARTUM | Facility: CLINIC | Age: 22
End: 2022-05-16
Payer: MEDICAID

## 2022-05-16 DIAGNOSIS — O35.1XX0 MATERNAL CARE FOR (SUSPECTED) CHROMOSOMAL ABNORMALITY IN FETUS, NOT APPLICABLE OR UNSPECIFIED: ICD-10-CM

## 2022-05-16 PROCEDURE — 36415 COLL VENOUS BLD VENIPUNCTURE: CPT

## 2023-01-13 NOTE — ED ADULT NURSE NOTE - NO SIGNIFICANT PAST SURGICAL HISTORY
<<----- Click to add NO significant Past Surgical History Cimetidine Counseling:  I discussed with the patient the risks of Cimetidine including but not limited to gynecomastia, headache, diarrhea, nausea, drowsiness, arrhythmias, pancreatitis, skin rashes, psychosis, bone marrow suppression and kidney toxicity.

## 2023-04-21 NOTE — ED ADULT NURSE NOTE - NAME OF THE PERSON WHO RECEIVED REPORT AT THE FACILITY THE PATIENT IS TRANSFERRING TO
FYI    Patient returned call. Writer informed patient that orders were placed (and they will call to schedule) and medication was sent to pharmacy. Patient had a verbal understanding.    No contact is further needed.    report given to transfer team

## 2023-04-21 NOTE — ED ADULT TRIAGE NOTE - HEART RATE (BEATS/MIN)
Dear Arcelia,    Would you please schedule a first available telehealth for Luis (next week preferable).    Thank you,  Keya
87
Purse String (Intermediate) Text: Given the location of the defect and the characteristics of the surrounding skin a pursestring intermediate closure was deemed most appropriate.  Undermining was performed circumfirentially around the surgical defect.  A purstring suture was then placed and tightened.

## 2024-06-26 NOTE — OB PROVIDER TRIAGE NOTE - NS_DCGESTAGE_OBGYN_ALL_OB_FT
Pt arrived to clinic for scheduled Benlysta. VSS. Pre meds of tylenol and benadryl given. Tolerated infusion well. Voices no concerns at this time. Discharged from clinic in [facility].  
ARELY herr
29.1